# Patient Record
Sex: MALE | Race: WHITE | NOT HISPANIC OR LATINO | Employment: FULL TIME | URBAN - METROPOLITAN AREA
[De-identification: names, ages, dates, MRNs, and addresses within clinical notes are randomized per-mention and may not be internally consistent; named-entity substitution may affect disease eponyms.]

---

## 2019-12-13 ENCOUNTER — OFFICE VISIT (OUTPATIENT)
Dept: URGENT CARE | Age: 34
End: 2019-12-13
Payer: COMMERCIAL

## 2019-12-13 VITALS
HEART RATE: 76 BPM | SYSTOLIC BLOOD PRESSURE: 117 MMHG | HEIGHT: 71 IN | TEMPERATURE: 98.2 F | DIASTOLIC BLOOD PRESSURE: 76 MMHG | WEIGHT: 166.8 LBS | OXYGEN SATURATION: 98 % | BODY MASS INDEX: 23.35 KG/M2 | RESPIRATION RATE: 16 BRPM

## 2019-12-13 DIAGNOSIS — S61.011A LACERATION OF RIGHT THUMB WITHOUT FOREIGN BODY WITHOUT DAMAGE TO NAIL, INITIAL ENCOUNTER: Primary | ICD-10-CM

## 2019-12-13 PROCEDURE — G0382 LEV 3 HOSP TYPE B ED VISIT: HCPCS | Performed by: FAMILY MEDICINE

## 2019-12-13 NOTE — PROGRESS NOTES
[facility]        NAME: Rolly Wlels is a 29 y o  male  : 1985    MRN: [id number]  DATE: 2019  TIME: 5:50 PM    Assessment and Plan   Laceration of right thumb without foreign body without damage to nail, initial encounter [S61 011A]  1  Laceration of right thumb without foreign body without damage to nail, initial encounter           Patient Instructions     Patient Instructions   Rest, elevate right thumb, limit activity  Clean dressing to area  Ice to area 2 to 3 times a day for 15-20 minutes  Recheck with family physician as needed  Please go to the hospital emergency department if needed  Follow up with PCP in 3-5 days  Proceed to  ER if symptoms worsen  Chief Complaint     Chief Complaint   Patient presents with    Hand Laceration     pt cut his thumb this am w/ knife          History of Present Illness       Patient states he cut the tip of his right thumb off while cutting apples this morning around 7:00 a m ; avulsed skin laceration present tip of the right thumb; patient is left-hand dominant; patient states his last tetanus immunization was 3 years ago      Review of Systems   Review of Systems   Skin:         Avulsed skin laceration present tip of the right thumb   All other systems reviewed and are negative  Current Medications     No current outpatient medications on file  Current Allergies     Allergies as of 2019    (No Known Allergies)            The following portions of the patient's history were reviewed and updated as appropriate: allergies, current medications, past family history, past medical history, past social history, past surgical history and problem list      History reviewed  No pertinent past medical history  History reviewed  No pertinent surgical history  History reviewed  No pertinent family history  Medications have been verified          Objective   /76 (BP Location: Left arm, Patient Position: Sitting)   Pulse 76   Temp 98 2 °F (36 8 °C) (Temporal)   Resp 16   Ht 5' 11" (1 803 m)   Wt 75 7 kg (166 lb 12 8 oz)   SpO2 98%   BMI 23 26 kg/m²        Physical Exam     Physical Exam   Constitutional: He is oriented to person, place, and time  He appears well-developed and well-nourished  Neurological: He is alert and oriented to person, place, and time  Skin:   Approximately 3/4 cm circular avulsed skin laceration tip of the right thumb; area cleaned; gelfoam and pressure dressing applied by the nurse   Psychiatric: He has a normal mood and affect  His behavior is normal    Nursing note and vitals reviewed

## 2019-12-13 NOTE — PATIENT INSTRUCTIONS
Rest, elevate right thumb, limit activity  Clean dressing to area  Ice to area 2 to 3 times a day for 15-20 minutes  Recheck with family physician as needed  Please go to the hospital emergency department if needed

## 2020-05-05 ENCOUNTER — OFFICE VISIT (OUTPATIENT)
Dept: URGENT CARE | Age: 35
End: 2020-05-05

## 2020-05-05 VITALS
TEMPERATURE: 98.4 F | RESPIRATION RATE: 18 BRPM | BODY MASS INDEX: 23.1 KG/M2 | HEART RATE: 65 BPM | OXYGEN SATURATION: 97 % | HEIGHT: 71 IN | WEIGHT: 165 LBS | SYSTOLIC BLOOD PRESSURE: 132 MMHG | DIASTOLIC BLOOD PRESSURE: 78 MMHG

## 2020-05-05 DIAGNOSIS — H57.89 IRRITATION OF LEFT EYE: Primary | ICD-10-CM

## 2020-05-05 PROCEDURE — G0382 LEV 3 HOSP TYPE B ED VISIT: HCPCS | Performed by: FAMILY MEDICINE

## 2020-05-05 RX ORDER — CLINDAMYCIN HYDROCHLORIDE 300 MG/1
300 CAPSULE ORAL 4 TIMES DAILY
Qty: 40 CAPSULE | Refills: 0 | Status: SHIPPED | OUTPATIENT
Start: 2020-05-05 | End: 2020-05-15

## 2020-05-05 RX ORDER — OFLOXACIN 3 MG/ML
1 SOLUTION/ DROPS OPHTHALMIC 4 TIMES DAILY
Qty: 5 ML | Refills: 0 | Status: SHIPPED | OUTPATIENT
Start: 2020-05-05 | End: 2020-05-15

## 2022-09-27 ENCOUNTER — APPOINTMENT (EMERGENCY)
Dept: CT IMAGING | Facility: HOSPITAL | Age: 37
End: 2022-09-27
Payer: COMMERCIAL

## 2022-09-27 ENCOUNTER — HOSPITAL ENCOUNTER (EMERGENCY)
Facility: HOSPITAL | Age: 37
Discharge: HOME | End: 2022-09-27
Attending: EMERGENCY MEDICINE
Payer: COMMERCIAL

## 2022-09-27 VITALS
DIASTOLIC BLOOD PRESSURE: 79 MMHG | HEART RATE: 84 BPM | TEMPERATURE: 98.2 F | OXYGEN SATURATION: 100 % | SYSTOLIC BLOOD PRESSURE: 154 MMHG | RESPIRATION RATE: 18 BRPM

## 2022-09-27 DIAGNOSIS — R31.9 HEMATURIA: Primary | ICD-10-CM

## 2022-09-27 LAB
ANION GAP SERPL CALCULATED.3IONS-SCNC: 6 MMOL/L (ref 4–13)
BACTERIA UR QL AUTO: ABNORMAL /HPF
BASOPHILS # BLD AUTO: 0.05 THOUSANDS/ΜL (ref 0–0.1)
BASOPHILS NFR BLD AUTO: 1 % (ref 0–1)
BILIRUB UR QL STRIP: NEGATIVE
BUN SERPL-MCNC: 20 MG/DL (ref 5–25)
CALCIUM SERPL-MCNC: 9.3 MG/DL (ref 8.4–10.2)
CHLORIDE SERPL-SCNC: 103 MMOL/L (ref 96–108)
CLARITY UR: CLEAR
CO2 SERPL-SCNC: 27 MMOL/L (ref 21–32)
COLOR UR: COLORLESS
CREAT SERPL-MCNC: 1.06 MG/DL (ref 0.6–1.3)
EOSINOPHIL # BLD AUTO: 0.06 THOUSAND/ΜL (ref 0–0.61)
EOSINOPHIL NFR BLD AUTO: 1 % (ref 0–6)
ERYTHROCYTE [DISTWIDTH] IN BLOOD BY AUTOMATED COUNT: 12.1 % (ref 11.6–15.1)
GFR SERPL CREATININE-BSD FRML MDRD: 89 ML/MIN/1.73SQ M
GLUCOSE SERPL-MCNC: 104 MG/DL (ref 65–140)
GLUCOSE UR STRIP-MCNC: NEGATIVE MG/DL
HCT VFR BLD AUTO: 45.1 % (ref 36.5–49.3)
HGB BLD-MCNC: 15 G/DL (ref 12–17)
HGB UR QL STRIP.AUTO: ABNORMAL
IMM GRANULOCYTES # BLD AUTO: 0.02 THOUSAND/UL (ref 0–0.2)
IMM GRANULOCYTES NFR BLD AUTO: 0 % (ref 0–2)
KETONES UR STRIP-MCNC: NEGATIVE MG/DL
LEUKOCYTE ESTERASE UR QL STRIP: NEGATIVE
LYMPHOCYTES # BLD AUTO: 1.69 THOUSANDS/ΜL (ref 0.6–4.47)
LYMPHOCYTES NFR BLD AUTO: 22 % (ref 14–44)
MCH RBC QN AUTO: 29.8 PG (ref 26.8–34.3)
MCHC RBC AUTO-ENTMCNC: 33.3 G/DL (ref 31.4–37.4)
MCV RBC AUTO: 90 FL (ref 82–98)
MONOCYTES # BLD AUTO: 0.53 THOUSAND/ΜL (ref 0.17–1.22)
MONOCYTES NFR BLD AUTO: 7 % (ref 4–12)
NEUTROPHILS # BLD AUTO: 5.35 THOUSANDS/ΜL (ref 1.85–7.62)
NEUTS SEG NFR BLD AUTO: 69 % (ref 43–75)
NITRITE UR QL STRIP: NEGATIVE
NON-SQ EPI CELLS URNS QL MICRO: ABNORMAL /HPF
NRBC BLD AUTO-RTO: 0 /100 WBCS
PH UR STRIP.AUTO: 6 [PH]
PLATELET # BLD AUTO: 206 THOUSANDS/UL (ref 149–390)
PMV BLD AUTO: 10 FL (ref 8.9–12.7)
POTASSIUM SERPL-SCNC: 4.1 MMOL/L (ref 3.5–5.3)
PROT UR STRIP-MCNC: NEGATIVE MG/DL
RBC # BLD AUTO: 5.03 MILLION/UL (ref 3.88–5.62)
RBC #/AREA URNS AUTO: ABNORMAL /HPF
SODIUM SERPL-SCNC: 136 MMOL/L (ref 135–147)
SP GR UR STRIP.AUTO: 1.01 (ref 1–1.03)
UROBILINOGEN UR STRIP-ACNC: <2 MG/DL
WBC # BLD AUTO: 7.7 THOUSAND/UL (ref 4.31–10.16)
WBC #/AREA URNS AUTO: ABNORMAL /HPF

## 2022-09-27 PROCEDURE — 36415 COLL VENOUS BLD VENIPUNCTURE: CPT | Performed by: EMERGENCY MEDICINE

## 2022-09-27 PROCEDURE — 99284 EMERGENCY DEPT VISIT MOD MDM: CPT | Performed by: EMERGENCY MEDICINE

## 2022-09-27 PROCEDURE — 74176 CT ABD & PELVIS W/O CONTRAST: CPT

## 2022-09-27 PROCEDURE — 81001 URINALYSIS AUTO W/SCOPE: CPT | Performed by: EMERGENCY MEDICINE

## 2022-09-27 PROCEDURE — 99284 EMERGENCY DEPT VISIT MOD MDM: CPT

## 2022-09-27 PROCEDURE — 80048 BASIC METABOLIC PNL TOTAL CA: CPT | Performed by: EMERGENCY MEDICINE

## 2022-09-27 PROCEDURE — G1004 CDSM NDSC: HCPCS

## 2022-09-27 PROCEDURE — 85025 COMPLETE CBC W/AUTO DIFF WBC: CPT | Performed by: EMERGENCY MEDICINE

## 2022-09-28 ENCOUNTER — TELEPHONE (OUTPATIENT)
Dept: UROLOGY | Facility: AMBULATORY SURGERY CENTER | Age: 37
End: 2022-09-28

## 2022-09-28 NOTE — TELEPHONE ENCOUNTER
Called patient, states just had two episodes of gross hematuria, still reports having discomfort when going to the bathroom  Reports no hematuria today  Stream is good, reports clear urine this morning, no fever/chills  No swelling  No low back pain today  Reports urine was pink lemonade in color when the blood was present  No recent injury, patient does run and workout but hasn't done anything different  Appt scheduled for 10/12/22 ED precautions reviewed with patient   Patient concerned with how far out appointment is advised can reach out to PCP in the mean time or go back to ED>

## 2022-09-28 NOTE — ED PROVIDER NOTES
History  Chief Complaint   Patient presents with    Blood in Urine     Pt presents with blood in urine that started today  Also reports suprapubic and penile pressure, along with burning on urination, frequency  Denies flank or abdominal pain  Denies nausea       History provided by:  Patient   used: No    Blood in Urine  Irritative symptoms do not include frequency or urgency  Pertinent negatives include no abdominal pain, chills, dysuria, fever, nausea or vomiting  Pt is a  41 y/o male presenting to ED with hematuria and lower abdomen cramping  Started today  No cp or sob  No abd pain  No flank or back pain  No n/v  Some burning with urination  No testicular pain  Sexually active with wife only, no condom use  No trauma  No unusual physical activity  No cocaine or drug use  No steroid use    mdm ua, cbc, bmp, reanl stone study    Hematuria on work up, outpatient urology follow up    None       History reviewed  No pertinent past medical history  History reviewed  No pertinent surgical history  History reviewed  No pertinent family history  I have reviewed and agree with the history as documented  E-Cigarette/Vaping     E-Cigarette/Vaping Substances     Social History     Tobacco Use    Smoking status: Never Smoker    Smokeless tobacco: Never Used   Substance Use Topics    Alcohol use: Yes    Drug use: Never       Review of Systems   Constitutional: Negative for chills, diaphoresis and fever  HENT: Negative for congestion and sore throat  Respiratory: Negative for cough, shortness of breath, wheezing and stridor  Cardiovascular: Negative for chest pain, palpitations and leg swelling  Gastrointestinal: Negative for abdominal pain, blood in stool, diarrhea, nausea and vomiting  Genitourinary: Positive for hematuria  Negative for dysuria, frequency and urgency  Musculoskeletal: Negative for neck pain and neck stiffness  Skin: Negative for pallor and rash  Neurological: Negative for dizziness, syncope, weakness, light-headedness and headaches  All other systems reviewed and are negative  Physical Exam  Physical Exam  Vitals reviewed  Constitutional:       Appearance: Normal appearance  He is well-developed  HENT:      Head: Normocephalic and atraumatic  Eyes:      Extraocular Movements: Extraocular movements intact  Pupils: Pupils are equal, round, and reactive to light  Cardiovascular:      Rate and Rhythm: Normal rate and regular rhythm  Heart sounds: Normal heart sounds  Pulmonary:      Effort: Pulmonary effort is normal  No respiratory distress  Breath sounds: Normal breath sounds  Abdominal:      General: Bowel sounds are normal       Palpations: Abdomen is soft  Tenderness: There is no abdominal tenderness  Genitourinary:     Penis: Normal        Testes: Normal    Musculoskeletal:         General: No swelling or tenderness  Normal range of motion  Cervical back: Normal range of motion and neck supple  Skin:     General: Skin is warm and dry  Capillary Refill: Capillary refill takes less than 2 seconds  Neurological:      General: No focal deficit present  Mental Status: He is alert and oriented to person, place, and time           Vital Signs  ED Triage Vitals [09/27/22 1747]   Temperature Pulse Respirations Blood Pressure SpO2   98 2 °F (36 8 °C) 84 18 154/79 100 %      Temp Source Heart Rate Source Patient Position - Orthostatic VS BP Location FiO2 (%)   Oral -- Sitting Right arm --      Pain Score       No Pain           Vitals:    09/27/22 1747   BP: 154/79   Pulse: 84   Patient Position - Orthostatic VS: Sitting         Visual Acuity      ED Medications  Medications - No data to display    Diagnostic Studies  Results Reviewed     Procedure Component Value Units Date/Time    Basic metabolic panel [[id number]] Collected: 09/27/22 1911    Lab Status: Final result Specimen: Blood from Arm, Right Updated: 09/27/22 1943     Sodium 136 mmol/L      Potassium 4 1 mmol/L      Chloride 103 mmol/L      CO2 27 mmol/L      ANION GAP 6 mmol/L      BUN 20 mg/dL      Creatinine 1 06 mg/dL      Glucose 104 mg/dL      Calcium 9 3 mg/dL      eGFR 89 ml/min/1 73sq m     Narrative:      [place] guidelines for Chronic Kidney Disease (CKD):     Stage 1 with normal or high GFR (GFR > 90 mL/min/1 73 square meters)    Stage 2 Mild CKD (GFR = 60-89 mL/min/1 73 square meters)    Stage 3A Moderate CKD (GFR = 45-59 mL/min/1 73 square meters)    Stage 3B Moderate CKD (GFR = 30-44 mL/min/1 73 square meters)    Stage 4 Severe CKD (GFR = 15-29 mL/min/1 73 square meters)    Stage 5 End Stage CKD (GFR <15 mL/min/1 73 square meters)  Note: GFR calculation is accurate only with a steady state creatinine    CBC and differential [[id number]] Collected: 09/27/22 1911    Lab Status: Final result Specimen: Blood from Arm, Right Updated: 09/27/22 1922     WBC 7 70 Thousand/uL      RBC 5 03 Million/uL      Hemoglobin 15 0 g/dL      Hematocrit 45 1 %      MCV 90 fL      MCH 29 8 pg      MCHC 33 3 g/dL      RDW 12 1 %      MPV 10 0 fL      Platelets 236 Thousands/uL      nRBC 0 /100 WBCs      Neutrophils Relative 69 %      Immat GRANS % 0 %      Lymphocytes Relative 22 %      Monocytes Relative 7 %      Eosinophils Relative 1 %      Basophils Relative 1 %      Neutrophils Absolute 5 35 Thousands/µL      Immature Grans Absolute 0 02 Thousand/uL      Lymphocytes Absolute 1 69 Thousands/µL      Monocytes Absolute 0 53 Thousand/µL      Eosinophils Absolute 0 06 Thousand/µL      Basophils Absolute 0 05 Thousands/µL     Urine Microscopic [[id number]]  (Abnormal) Collected: 09/27/22 1757    Lab Status: Final result Specimen: Urine, Clean Catch Updated: 09/27/22 1814     RBC, UA 2-4 /hpf      WBC, UA 1-2 /hpf      Epithelial Cells Occasional /hpf      Bacteria, UA None Seen /hpf     UA w Reflex to Microscopic w Reflex to Culture [[id number]]  (Abnormal) Collected: 09/27/22 1757    Lab Status: Final result Specimen: Urine, Clean Catch Updated: 09/27/22 1813     Color, UA Colorless     Clarity, UA Clear     Specific Gravity, UA 1 007     pH, UA 6 0     Leukocytes, UA Negative     Nitrite, UA Negative     Protein, UA Negative mg/dl      Glucose, UA Negative mg/dl      Ketones, UA Negative mg/dl      Urobilinogen, UA <2 0 mg/dl      Bilirubin, UA Negative     Occult Blood, UA Large                 CT renal stone study abdomen pelvis without contrast   Final Result by Bernard Fraser MD (09/27 2004)      No urinary tract calculi  No acute inflammatory process identified  Workstation performed: [id number]                    Procedures  Procedures         ED Course                                             MDM    Disposition  Final diagnoses:   Hematuria     Time reflects when diagnosis was documented in both MDM as applicable and the Disposition within this note     Time User Action Codes Description Comment    9/27/2022  8:09 PM Peace Vyas Add [R31 9] Hematuria       ED Disposition     ED Disposition   Discharge    Condition   Stable    Date/Time   Tue Sep 27, 2022  8:09 PM    Comment   Trav Thrasher discharge to home/self care                 Follow-up Information     Follow up With Specialties Details Why Contact Info Additional Information    [facility] Emergency Medicine  As needed, If symptoms worsen [address] [facility] [address] [facility] Emergency Department, [address], [facility] [place], [address] [facility], [address] [facility] [place] [facility] Schedule an appointment as soon as possible for a visit   [address] [facility] [address] [phone]  70  [facility] [place] [facility] [place], [address] [place], [place] [facility] [place], [address] [place] [address], [address] [facility]          There are no discharge medications for this patient  No discharge procedures on file      PDMP Review     None          ED Provider  Electronically Signed by           Kamari Harrison MD  09/27/22 2030

## 2022-09-28 NOTE — TELEPHONE ENCOUNTER
Please Triage  New Patient    What is the reason for the patients appointment? Pt was seen in the ER on 09/28/22  He has blood in his urine  He also had pressure and discomfort  No Kidney stones were found  No nausea or chills  Today he still has some blood in his urine and still has the pressure and discomfort  What office location does the patient prefer? [place]    Imaging/Lab Results: Epic    Do we accept the patient's insurance or is the patient Self-Pay? Insurance Provider: Aisha Neely  Plan Type/Number:  Member ID#: Has the patient had any previous Urologist(s)? no    Have patient records been requested? If not are records showing in Epic:     Has the patient had any outside testing done? no    Does the patient have a personal history of cancer?  No    Patient can be reached at [phone]

## 2025-04-02 ENCOUNTER — NEW PATIENT (OUTPATIENT)
Dept: URBAN - METROPOLITAN AREA CLINIC 6 | Facility: CLINIC | Age: 40
End: 2025-04-02

## 2025-04-02 DIAGNOSIS — H40.023: ICD-10-CM

## 2025-04-02 PROCEDURE — 92133 CPTRZD OPH DX IMG PST SGM ON: CPT

## 2025-04-02 PROCEDURE — 92004 COMPRE OPH EXAM NEW PT 1/>: CPT

## 2025-04-02 ASSESSMENT — TONOMETRY
OD_IOP_MMHG: 27
OS_IOP_MMHG: 27

## 2025-04-02 ASSESSMENT — VISUAL ACUITY
OD_CC: 20/20
OS_CC: 20/20

## 2025-04-22 ENCOUNTER — ESTABLISHED COMPREHENSIVE EXAM (OUTPATIENT)
Dept: URBAN - METROPOLITAN AREA CLINIC 6 | Facility: CLINIC | Age: 40
End: 2025-04-22

## 2025-04-22 DIAGNOSIS — H40.053: ICD-10-CM

## 2025-04-22 PROCEDURE — 92012 INTRM OPH EXAM EST PATIENT: CPT

## 2025-04-22 PROCEDURE — 92083 EXTENDED VISUAL FIELD XM: CPT

## 2025-04-22 PROCEDURE — 76514 ECHO EXAM OF EYE THICKNESS: CPT

## 2025-04-22 PROCEDURE — 92020 GONIOSCOPY: CPT

## 2025-04-22 ASSESSMENT — PACHYMETRY
OD_CT_UM: 543
OS_CT_UM: 548

## 2025-04-22 ASSESSMENT — TONOMETRY
OD_IOP_MMHG: 32
OS_IOP_MMHG: 34

## 2025-04-22 ASSESSMENT — VISUAL ACUITY
OS_CC: 20/20
OD_CC: 20/20

## 2025-05-16 ENCOUNTER — IOP CHECK (OUTPATIENT)
Dept: URBAN - METROPOLITAN AREA CLINIC 6 | Facility: CLINIC | Age: 40
End: 2025-05-16

## 2025-05-16 DIAGNOSIS — H40.053: ICD-10-CM

## 2025-05-16 PROCEDURE — 92012 INTRM OPH EXAM EST PATIENT: CPT

## 2025-05-16 ASSESSMENT — TONOMETRY
OS_IOP_MMHG: 32
OD_IOP_MMHG: 35
OS_IOP_MMHG: 31
OD_IOP_MMHG: 33

## 2025-05-16 ASSESSMENT — VISUAL ACUITY
OD_CC: 20/20
OS_CC: 20/20

## 2025-06-20 ENCOUNTER — IOP CHECK (OUTPATIENT)
Dept: URBAN - METROPOLITAN AREA CLINIC 6 | Facility: CLINIC | Age: 40
End: 2025-06-20

## 2025-06-20 DIAGNOSIS — H40.053: ICD-10-CM

## 2025-06-20 PROCEDURE — 92012 INTRM OPH EXAM EST PATIENT: CPT

## 2025-06-20 ASSESSMENT — VISUAL ACUITY
OS_CC: 20/20
OD_CC: 20/20

## 2025-06-20 ASSESSMENT — TONOMETRY
OS_IOP_MMHG: 21
OD_IOP_MMHG: 19